# Patient Record
Sex: MALE | Race: BLACK OR AFRICAN AMERICAN | Employment: OTHER | URBAN - METROPOLITAN AREA
[De-identification: names, ages, dates, MRNs, and addresses within clinical notes are randomized per-mention and may not be internally consistent; named-entity substitution may affect disease eponyms.]

---

## 2017-08-03 ENCOUNTER — HOSPITAL ENCOUNTER (EMERGENCY)
Age: 62
Discharge: HOME | End: 2017-08-03
Attending: EMERGENCY MEDICINE
Payer: COMMERCIAL

## 2017-08-03 VITALS
HEART RATE: 60 BPM | BODY MASS INDEX: 26.4 KG/M2 | WEIGHT: 168.21 LBS | RESPIRATION RATE: 16 BRPM | HEIGHT: 67 IN | OXYGEN SATURATION: 100 % | SYSTOLIC BLOOD PRESSURE: 145 MMHG | DIASTOLIC BLOOD PRESSURE: 81 MMHG | TEMPERATURE: 97.9 F

## 2017-08-03 DIAGNOSIS — M54.41 ACUTE RIGHT-SIDED LOW BACK PAIN WITH RIGHT-SIDED SCIATICA: Primary | ICD-10-CM

## 2017-08-03 DIAGNOSIS — M47.816 FACET ARTHRITIS OF LUMBAR REGION: ICD-10-CM

## 2017-08-03 DIAGNOSIS — M51.36 DEGENERATIVE DISC DISEASE, LUMBAR: ICD-10-CM

## 2017-08-03 PROCEDURE — 99282 EMERGENCY DEPT VISIT SF MDM: CPT

## 2017-08-03 RX ORDER — GLUCOSAMINE SULFATE 1500 MG
1000 POWDER IN PACKET (EA) ORAL 2 TIMES DAILY
COMMUNITY

## 2017-08-03 RX ORDER — HYDROCODONE BITARTRATE AND ACETAMINOPHEN 7.5; 325 MG/1; MG/1
.5-1 TABLET ORAL
Qty: 20 TAB | Refills: 0 | Status: SHIPPED | OUTPATIENT
Start: 2017-08-03 | End: 2018-02-17

## 2017-08-03 RX ORDER — NAPROXEN 500 MG/1
500 TABLET ORAL
Qty: 20 TAB | Refills: 0 | Status: SHIPPED | OUTPATIENT
Start: 2017-08-03 | End: 2018-02-17

## 2017-08-03 RX ORDER — ATORVASTATIN CALCIUM 10 MG/1
10 TABLET, FILM COATED ORAL DAILY
COMMUNITY

## 2017-08-04 NOTE — ED PROVIDER NOTES
HPI Comments: Bryan Daly is a 59 y.o. male with PMHx significant for HTN, hyperlipidemia, who presents ambulatory to [facility] ED with cc of intermittent aching R hip pain that radiates into his R thigh x 1 month. He also notes intermittent R knee numbness that began this morning. Pt currently denies any pain, and states his symptoms typically present when standing or ambulating for long periods of time. He reports improvement with sitting. 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 Pt denies taking any medication for his symptoms. He denies any recent injuries or falls. Pt specifically denies any recent fever, difficulty urinating, urinary/fecal incontinence, bowel symptoms, or abdominal pain. He denies a hx of back surgeries, kidney disease, liver disease, thyroid disease, or DM. PCP: Norberto Kendrick MD      Social History: (-) Tobacco, (+) EtOH, (-) Illicit Drugs         There are no other complaints, changes, or physical findings at this time. The history is provided by the patient. No  was used. Past Medical History:   Diagnosis Date    High cholesterol     Hypertension        History reviewed. No pertinent surgical history. History reviewed. No pertinent family history. Social History     Social History    Marital status: SINGLE     Spouse name: N/A    Number of children: N/A    Years of education: N/A     Occupational History    Not on file. Social History Main Topics    Smoking status: Never Smoker    Smokeless tobacco: Never Used    Alcohol use 6.0 - 8.4 oz/week     10 - 14 Cans of beer per week      Comment: 2 per day    Drug use: Not on file    Sexual activity: Not on file     Other Topics Concern    Not on file     Social History Narrative    No narrative on file         ALLERGIES: Review of patient's allergies indicates no known allergies. Review of Systems   Constitutional: Negative. Negative for fever. HENT: Negative. Eyes: Negative. Respiratory: Negative. Cardiovascular: Negative. Gastrointestinal: Negative. Negative for constipation, diarrhea, nausea and vomiting. Denies liver disease  - fecal incontinence   Endocrine:        Denies thyroid disease   Genitourinary: Negative. Negative for dysuria. Denies kidney disease  - urinary incontinence   Musculoskeletal:        + R hip pain radiating into R thigh   Skin: Negative. Neurological: Positive for numbness (R knee). All other systems reviewed and are negative. Vitals:    08/03/17 2025   BP: 145/81   Pulse: 60   Resp: 16   Temp: 97.9 °F (36.6 °C)   SpO2: 100%   Weight: 76.3 kg (168 lb 3.4 oz)   Height: 5' 7\" (1.702 m)            Physical Exam   Constitutional: He is oriented to person, place, and time. He appears well-developed and well-nourished. No distress. HENT:   Head: Normocephalic and atraumatic. Right Ear: External ear normal.   Left Ear: External ear normal.   Nose: Nose normal.   Mouth/Throat: Oropharynx is clear and moist. No oropharyngeal exudate. Eyes: Conjunctivae and EOM are normal. Pupils are equal, round, and reactive to light. Right eye exhibits no discharge. Left eye exhibits no discharge. No scleral icterus. Neck: Normal range of motion. Neck supple. No tracheal deviation present. Cardiovascular: Normal rate, regular rhythm, normal heart sounds and intact distal pulses. Exam reveals no gallop and no friction rub. No murmur heard. Pulmonary/Chest: Effort normal and breath sounds normal. No respiratory distress. He has no wheezes. He has no rales. He exhibits no tenderness. Abdominal: Soft. Bowel sounds are normal. He exhibits no distension and no mass. There is no tenderness. There is no rebound and no guarding. Musculoskeletal: He exhibits no edema. Tenderness to R lumbar musculature. No contusions or deformity to spine. Ambulatory without difficulty. Negative SLR.    Lymphadenopathy: He has no cervical adenopathy. Neurological: He is alert and oriented to person, place, and time. No cranial nerve deficit. Coordination normal.   NVI BLE   Skin: Skin is warm and dry. No rash noted. No erythema. Psychiatric: He has a normal mood and affect. His behavior is normal. Judgment and thought content normal.   Nursing note and vitals reviewed. MDM  Number of Diagnoses or Management Options  Acute right-sided low back pain with right-sided sciatica:   Degenerative disc disease, lumbar:   Facet arthritis of lumbar region [facility]):   Diagnosis management comments: DDx: DDD, arthritis, sciatica, sprain, strain       Amount and/or Complexity of Data Reviewed  Review and summarize past medical records: yes    Patient Progress  Patient progress: stable    ED Course       Procedures      PROGRESS NOTE  8:46 PM   Reviewed MRI results from 5/19/2009 with pt and his wife. Pt was unaware he had degenerative discs in his lower back. MEDICATIONS GIVEN:  Medications - No data to display    IMPRESSION:  1. Acute right-sided low back pain with right-sided sciatica    2. Degenerative disc disease, lumbar    3. Facet arthritis of lumbar region [facility])        PLAN:  1. Discharge Medication List as of 8/3/2017  8:54 PM      START taking these medications    Details   naproxen (NAPROSYN) 500 mg tablet Take 1 Tab by mouth every twelve (12) hours as needed for Pain., Normal, Disp-20 Tab, R-0      HYDROcodone-acetaminophen (NORCO) 7.5-325 mg per tablet Take 0.5-1 Tabs by mouth every six (6) hours as needed for Pain (Take in addition to Naproxen for severe pain flares. ). Max Daily Amount: 4 Tabs., Print, Disp-20 Tab, R-0         CONTINUE these medications which have NOT CHANGED    Details   atorvastatin (LIPITOR) 10 mg tablet Take 10 mg by mouth daily. , Historical Med      cholecalciferol (VITAMIN D3) 1,000 unit cap Take 1,000 Units by mouth two (2) times a day., Historical Med           2.    Follow-up Information Follow up With Details Comments Contact Info    Kieran Fry MD   [address] Rossy Hester MD  neurosurgeon, for further evaluation as needed. [address]  [phone]      \[facility] [place] [facility]\"" EMERGENCY DEPT  If symptoms worsen [address] [facility] [address]  [phone]          Return to ED if worse     Discharge Note:  8:58 PM  The patient has been re-evaluated and is ready for discharge. Reviewed available results with patient. Counseled patient on diagnosis and care plan. Patient has expressed understanding, and all questions have been answered. Patient agrees with plan and agrees to follow up as recommended, or to return to the ED if their symptoms worsen. Discharge instructions have been provided and explained to the patient, along with reasons to return to the ED. Written by Meghana Gamble, ED Scribe, as dictated by [employer]. This note is prepared by Meghana Gamble, acting as Scribe for [employer]. LALIT Barrera: The scribe's documentation has been prepared under my direction and personally reviewed by me in its entirety. I confirm that the note above accurately reflects all work, treatment, procedures, and medical decision making performed by me.

## 2017-08-04 NOTE — ED TRIAGE NOTES
Patient arrives ambulatory to ED with wife with complaint of right hip pain that shoots down to right thigh, sometimes feels numb around knee, on and off for about a month. Patient states the numbness is new as it started around 9am today. Patient denies any injury. Patient denies chest pain, shortness of breath, N/V/D.

## 2017-08-04 NOTE — ED NOTES
Patient was on lisinopril but this was changed, patient unsure of what blood pressure medication he takes a this time.

## 2017-08-04 NOTE — DISCHARGE INSTRUCTIONS
Learning About Degenerative Disc Disease  What is degenerative disc disease? Degenerative disc disease is not really a disease. It's a term used to describe the normal changes in your spinal discs as you age. Spinal discs are small, spongy discs that separate the bones (vertebrae) that make up the spine. The discs act as shock absorbers for the spine, so it can flex, bend, and twist.  Degenerative disc disease can take place in one or more places along the spine. It most often occurs in the discs in the lower back and the neck. What causes it? As we age, our spinal discs break down, or degenerate. This breakdown causes the symptoms of degenerative disc disease in some people. When the discs break down, they can lose fluid and dry out, and their outer layers can have tiny cracks or tears. This leads to less padding and less space between the bones in the spine. The body reacts to this by making bony growths on the spine called bone spurs. These spurs can press on the spinal nerve roots or spinal cord. This can cause pain and can affect how well the nerves work. What are the symptoms? Many people with degenerative disc disease have no pain. But others have severe pain or other symptoms that limit their activities. Some of the most common symptoms are:  · Pain in the back or neck. Where the pain occurs depends on which discs are affected. · Pain that gets worse when you move, such as bending over, reaching up, or twisting. · Pain that may occur in the rear end (buttocks), arm, or leg if a nerve is pinched. · Numbness or tingling in your arm or leg. How is it diagnosed? A doctor can often diagnose degenerative disc disease while doing a physical exam. If your exam shows no signs of a serious condition, imaging tests (such as an X-ray) aren't likely to help your doctor find the cause of your symptoms.   Sometimes degenerative disc disease is found when an X-ray is taken for another reason, such as an injury or other health problem. But even if the doctor finds degenerative disc disease, that doesn't always mean that you will have symptoms. How is it treated? There are several things you can do at home to manage pain from this problem. · To relieve pain, use ice or heat (whichever feels better) on the affected area. ¨ Put ice or a cold pack on the area for 10 to 20 minutes at a time. Put a thin cloth between the ice and your skin. ¨ Put a warm water bottle, a heating pad set on low, or a warm cloth on your back. Put a thin cloth between the heating pad and your skin. Do not go to sleep with a heating pad on your skin. · Ask your doctor if you can take acetaminophen (such as Tylenol) or nonsteroidal anti-inflammatory drugs, such as ibuprofen or naproxen. Your doctor can prescribe stronger medicines if needed. Be safe with medicines. Read and follow all instructions on the label. · Get some exercise every day. Exercise is one of the best ways to help your back feel better and stay better. It's best to start each exercise slowly. You may notice a little soreness, and that's okay. But if an exercise makes your pain worse, stop doing it. Here are things you can try:  ¨ Walking. It's the simplest and maybe the best activity for your back. It gets your blood moving and helps your muscles stay strong. ¨ Exercises that gently stretch and strengthen your stomach, back, and leg muscles. The stronger those muscles are, the better they're able to protect your back. If you have constant or severe pain in your back or spine, you may need other treatments, such as physical therapy. In some cases, your doctor may suggest surgery. Follow-up care is a key part of your treatment and safety. Be sure to make and go to all appointments, and call your doctor if you are having problems. It's also a good idea to know your test results and keep a list of the medicines you take. Where can you learn more?   Go to http://milton-bert.info/. Enter H522 in the search box to learn more about \"Learning About Degenerative Disc Disease. \"  Current as of: March 21, 2017  Content Version: 11.3  © 1110-3074 [employer]. Care instructions adapted under license by [employer] (which disclaims liability or warranty for this information). If you have questions about a medical condition or this instruction, always ask your healthcare professional. [address] any warranty or liability for your use of this information. Herniated Disc: Care Instructions  Your Care Instructions    The bones that form the spine in your back are cushioned by small discs. If a disc is damaged, it may bulge or break open (herniate). A herniated disc can result from normal wear and tear as we age or from an injury or disease. If a herniated disc presses on a nerve, it can cause pain and numbness in your leg (sciatica) and/or back pain. You may be able to heal your herniated disc with a few weeks or months of rest, medicine, and exercises. In some cases, you may need surgery. Follow-up care is a key part of your treatment and safety. Be sure to make and go to all appointments, and call your doctor if you are having problems. It's also a good idea to know your test results and keep a list of the medicines you take. How can you care for yourself at home? · Take your medicines exactly as prescribed. Call your doctor if you think you are having a problem with your medicine. · Ask your doctor if you can take an over-the-counter pain medicine, such as acetaminophen (Tylenol), ibuprofen (Advil, Motrin), or naproxen (Aleve). Read and follow all instructions on the label. · Do not take two or more pain medicines at the same time unless the doctor told you to. Many pain medicines have acetaminophen, which is Tylenol. Too much acetaminophen (Tylenol) can be harmful.   · Rest your back if your pain is severe. · Avoid movements and positions that increase your pain or numbness. · Try taking short walks and doing light activities that do not cause pain. Even if you are feeling some pain, it is important to keep your muscles active and strong. · Use heat or ice to relieve pain. ¨ To apply heat, put a warm water bottle, heating pad set on low, or warm cloth on your back. Do not go to sleep with a heating pad on your skin. ¨ To use ice, put ice or a cold pack on the area for 10 to 20 minutes at a time. Put a thin cloth between the ice and your skin. · Your doctor may recommend a physical therapy program, where you learn exercises to do at home. These exercises strengthen the muscles that support your lower back and prevent reinjury. · Stay at a healthy weight. This may reduce the load on your back. · Quit smoking if you smoke. If you need help quitting, talk to your doctor about stop-smoking programs and medicines. These can increase your chances of quitting for good. · To avoid hurting your back when lifting:  ¨ Lift with your legs, not your back, by squatting and bending your knees. Avoid bending forward at the waist when lifting. ¨ Rise slowly. ¨ Keep the load as close to your body as possible, at the level of your navel. ¨ Avoid turning or twisting your body while holding a heavy object. ¨ Get help if you need to lift a heavy object. Never lift a heavy object above shoulder level. When should you call for help? Call 911 anytime you think you may need emergency care. For example, call if:  · You are unable to move a leg at all. Call your doctor now or seek immediate medical care if:  · You have new or worse symptoms in your arms, legs, chest, belly, or buttocks. Symptoms may include:  ¨ Numbness or tingling. ¨ Weakness. ¨ Pain. · You lose bladder or bowel control. Watch closely for changes in your health, and be sure to contact your doctor if:  · You are not getting better as expected.   Where can you learn more? Go to http://milton-bert.info/. Enter F534 in the search box to learn more about \"Herniated Disc: Care Instructions. \"  Current as of: March 21, 2017  Content Version: 11.3  © 5181-4039 [employer]. Care instructions adapted under license by [employer] (which disclaims liability or warranty for this information). If you have questions about a medical condition or this instruction, always ask your healthcare professional. [place] [address] any warranty or liability for your use of this information.

## 2018-02-17 ENCOUNTER — HOSPITAL ENCOUNTER (EMERGENCY)
Age: 63
Discharge: HOME | End: 2018-02-17
Attending: EMERGENCY MEDICINE
Payer: COMMERCIAL

## 2018-02-17 ENCOUNTER — APPOINTMENT (OUTPATIENT)
Dept: GENERAL RADIOLOGY | Age: 63
End: 2018-02-17
Attending: PHYSICIAN ASSISTANT
Payer: COMMERCIAL

## 2018-02-17 VITALS
SYSTOLIC BLOOD PRESSURE: 154 MMHG | DIASTOLIC BLOOD PRESSURE: 86 MMHG | BODY MASS INDEX: 28.52 KG/M2 | WEIGHT: 177.47 LBS | TEMPERATURE: 97.9 F | RESPIRATION RATE: 18 BRPM | OXYGEN SATURATION: 100 % | HEART RATE: 71 BPM | HEIGHT: 66 IN

## 2018-02-17 DIAGNOSIS — M25.561 ACUTE PAIN OF RIGHT KNEE: ICD-10-CM

## 2018-02-17 DIAGNOSIS — M11.261 CHONDROCALCINOSIS OF KNEE, RIGHT: Primary | ICD-10-CM

## 2018-02-17 PROCEDURE — 73562 X-RAY EXAM OF KNEE 3: CPT

## 2018-02-17 PROCEDURE — 99282 EMERGENCY DEPT VISIT SF MDM: CPT

## 2018-02-17 RX ORDER — HYDROCODONE BITARTRATE AND ACETAMINOPHEN 5; 325 MG/1; MG/1
1 TABLET ORAL
Qty: 10 TAB | Refills: 0 | Status: SHIPPED | OUTPATIENT
Start: 2018-02-17

## 2018-02-17 RX ORDER — IBUPROFEN 800 MG/1
800 TABLET ORAL
Qty: 20 TAB | Refills: 0 | Status: SHIPPED | OUTPATIENT
Start: 2018-02-17 | End: 2018-02-24

## 2018-02-17 NOTE — LETTER
[facility] EMERGENCY DEPT 
[address] [place] [address] [address] [id number] 
[phone] Work/School Note Date: 2/17/2018 To Whom It May concern: 
 
Rice Monroe Burns was seen and treated today in the emergency room by the following provider(s): 
Attending Provider: Kaylan Mantilla MD 
Physician Assistant: SUBHA Acuna. Steve Chua may return to work on . Sincerely, SUBHA Acuna

## 2018-02-18 NOTE — ED TRIAGE NOTES
Pt presents to ED with complaints of right knee swelling over the last week or so. No injury/trauma/pain. No swelling at this time. No hx of injury to that knee/leg. Pt has been alternating heat and ice as well as taking Aleve.

## 2018-02-18 NOTE — ED PROVIDER NOTES
EMERGENCY DEPARTMENT HISTORY AND PHYSICAL EXAM      Date: 2/17/2018  Patient Name: Jackqulyn Jeans.    History of Presenting Illness     Chief Complaint   Patient presents with    Knee Pain     to right knee x 3 weeks & no injury noted       History Provided By: Patient    HPI: Jackqulyn Jeans., 57 y.o. male with PMHx significant for HTN and HLD, presents ambulatory to the ED with cc of constant R knee swelling x one week. Pt denies any pain in the R knee. He denies any recent trauma or injuries. Pt reports taking aleve and applying ice and heat alternatively without significant relief. Pt denies additional modifying factors. He denies other sxs including fevers, chills, sore throat, rhinorrhea, SOB, CP, abdominal pain, N/V/D, dysuria, dizziness, HA, and rashes. Social hx: -Tobacco, -EtOH, -Drugs    PCP: Klaudia Todd MD    There are no other complaints, changes, or physical findings at this time. Current Outpatient Prescriptions   Medication Sig Dispense Refill    ibuprofen (MOTRIN) 800 mg tablet Take 1 Tab by mouth every eight (8) hours as needed for Pain for up to 7 days. 20 Tab 0    HYDROcodone-acetaminophen (NORCO) 5-325 mg per tablet Take 1 Tab by mouth every four (4) hours as needed for Pain. Max Daily Amount: 6 Tabs. 10 Tab 0    atorvastatin (LIPITOR) 10 mg tablet Take 10 mg by mouth daily.  cholecalciferol (VITAMIN D3) 1,000 unit cap Take 1,000 Units by mouth two (2) times a day. Past History     Past Medical History:  Past Medical History:   Diagnosis Date    High cholesterol     Hypertension        Past Surgical History:  History reviewed. No pertinent surgical history. Family History:  History reviewed. No pertinent family history.     Social History:  Social History   Substance Use Topics    Smoking status: Never Smoker    Smokeless tobacco: Never Used    Alcohol use 6.0 - 8.4 oz/week     10 - 14 Cans of beer per week      Comment: 2 per day       Allergies:  No Known Allergies      Review of Systems   Review of Systems   Constitutional: Negative for fatigue and fever. HENT: Negative for congestion, ear pain and rhinorrhea. Eyes: Negative for pain and redness. Respiratory: Negative for cough and wheezing. Cardiovascular: Negative for chest pain and palpitations. Gastrointestinal: Negative for abdominal pain, nausea and vomiting. Genitourinary: Negative for dysuria, frequency and urgency. Musculoskeletal: Positive for joint swelling (R knee). Negative for back pain, neck pain and neck stiffness. Skin: Negative for rash and wound. Neurological: Negative for weakness, light-headedness, numbness and headaches. Physical Exam   Physical Exam   Constitutional: He is oriented to person, place, and time. He appears well-developed and well-nourished. Non-toxic appearance. No distress. HENT:   Head: Normocephalic and atraumatic. Head is without right periorbital erythema and without left periorbital erythema. Right Ear: External ear normal.   Left Ear: External ear normal.   Nose: Nose normal.   Mouth/Throat: Uvula is midline. No trismus in the jaw. Eyes: Conjunctivae and EOM are normal. Pupils are equal, round, and reactive to light. No scleral icterus. Neck: Normal range of motion and full passive range of motion without pain. Cardiovascular: Normal rate, regular rhythm and normal heart sounds. Pulmonary/Chest: Effort normal and breath sounds normal. No accessory muscle usage. No tachypnea. No respiratory distress. He has no decreased breath sounds. He has no wheezes. Abdominal: Soft. There is no tenderness. There is no rigidity and no guarding. Musculoskeletal: Normal range of motion. RIGHT KNEE  Able to flex knee > 90 deg  No bruising, redness or deformity  Good symmetry; no appreciable swelling  Anterior tenderness  Provocative maneuvers deferred   Neurological: He is alert and oriented to person, place, and time. He is not disoriented.  No cranial nerve deficit or sensory deficit. GCS eye subscore is 4. GCS verbal subscore is 5. GCS motor subscore is 6. Skin: Skin is intact. No rash noted. Psychiatric: He has a normal mood and affect. His speech is normal.   Nursing note and vitals reviewed. Diagnostic Study Results     Labs -   No results found for this or any previous visit (from the past 12 hour(s)). Radiologic Studies -   XR KNEE RT 3 V   Final Result   EXAM:  XR KNEE RT 3 V  INDICATION: Right knee pain for 3 weeks. No trauma. COMPARISON: None.     FINDINGS: Three views of the right knee demonstrate no fracture, effusion or  other acute osseous or articular abnormality. There is minimal chondrocalcinosis  and a small suprapatellar effusion. The soft tissues are within normal limits.     IMPRESSION  IMPRESSION: No acute abnormality. Minimal chondrocalcinosis and small knee  effusion.                      Medical Decision Making   I am the first provider for this patient. I reviewed the vital signs, available nursing notes, past medical history, past surgical history, family history and social history. Vital Signs-Reviewed the patient's vital signs. Patient Vitals for the past 12 hrs:   Temp Pulse Resp BP SpO2   02/17/18 2010 97.9 °F (36.6 °C) 71 18 154/86 100 %       Records Reviewed: Old Medical Records    Provider Notes (Medical Decision Making):   DDx: fracture, contusion, effusion, sprain, strain; cannot exclude internal derangement. ED Course:   Initial assessment performed. The patients presenting problems have been discussed, and they are in agreement with the care plan formulated and outlined with them. I have encouraged them to ask questions as they arise throughout their visit. Critical Care Time:   0    Disposition:  DISCHARGE NOTE  9:01 PM  The patient has been re-evaluated and is ready for discharge. Reviewed available results with patient. Counseled pt on diagnosis and care plan.  Pt has expressed understanding, and all questions have been answered. Pt agrees with plan and agrees to F/U as recommended, or return to the ED if their sxs worsen. Discharge instructions have been provided and explained to the pt, along with reasons to return to the ED. PLAN:  1. Discharge Medication List as of 2/17/2018  8:51 PM      START taking these medications    Details   ibuprofen (MOTRIN) 800 mg tablet Take 1 Tab by mouth every eight (8) hours as needed for Pain for up to 7 days. , Print, Disp-20 Tab, R-0      HYDROcodone-acetaminophen (NORCO) 5-325 mg per tablet Take 1 Tab by mouth every four (4) hours as needed for Pain. Max Daily Amount: 6 Tabs., Print, Disp-10 Tab, R-0         CONTINUE these medications which have NOT CHANGED    Details   atorvastatin (LIPITOR) 10 mg tablet Take 10 mg by mouth daily. , Historical Med      cholecalciferol (VITAMIN D3) 1,000 unit cap Take 1,000 Units by mouth two (2) times a day., Historical Med           2. Follow-up Information     Follow up With Details Comments Contact Info    Sonia Malave MD Schedule an appointment as soon as possible for a visit ORTHO: call to schedule follow up [address] [place], [place] [phone]          Return to ED if worse     Diagnosis     Clinical Impression:   1. Chondrocalcinosis of knee, right    2. Acute pain of right knee        Attestations: This note is prepared by Adrianna Hernandez, acting as Scribe for Preston Pereira. The scribe's documentation has been prepared under my direction and personally reviewed by me in its entirety. I confirm that the note above accurately reflects all work, treatment, procedures, and medical decision making performed by me.   LALIT Mahmood

## 2022-01-25 LAB — SARS-COV-2, NAA: NOT DETECTED
